# Patient Record
Sex: MALE | Race: OTHER | Employment: FULL TIME | URBAN - METROPOLITAN AREA
[De-identification: names, ages, dates, MRNs, and addresses within clinical notes are randomized per-mention and may not be internally consistent; named-entity substitution may affect disease eponyms.]

---

## 2017-04-25 ENCOUNTER — OFFICE VISIT (OUTPATIENT)
Dept: FAMILY MEDICINE CLINIC | Age: 32
End: 2017-04-25

## 2017-04-25 VITALS
TEMPERATURE: 97.5 F | DIASTOLIC BLOOD PRESSURE: 97 MMHG | HEART RATE: 77 BPM | RESPIRATION RATE: 16 BRPM | SYSTOLIC BLOOD PRESSURE: 140 MMHG | OXYGEN SATURATION: 98 % | WEIGHT: 277.4 LBS | BODY MASS INDEX: 44.58 KG/M2 | HEIGHT: 66 IN

## 2017-04-25 DIAGNOSIS — H53.8 BLURRY VISION: ICD-10-CM

## 2017-04-25 DIAGNOSIS — I10 ESSENTIAL HYPERTENSION: Primary | ICD-10-CM

## 2017-04-25 DIAGNOSIS — E66.09 NON MORBID OBESITY DUE TO EXCESS CALORIES: ICD-10-CM

## 2017-04-25 LAB — HBA1C MFR BLD HPLC: 5.3 %

## 2017-04-25 RX ORDER — LISINOPRIL 10 MG/1
10 TABLET ORAL DAILY
Qty: 30 TAB | Refills: 3 | Status: SHIPPED | OUTPATIENT
Start: 2017-04-25 | End: 2018-04-09 | Stop reason: SDUPTHER

## 2017-04-25 NOTE — PROGRESS NOTES
Christina Mike is a 29 y.o.  male and presents with     Chief Complaint   Patient presents with    Hypertension    Obesity       Pt was jorge alberto mcgregor in 2014. Pt is currently not taking any meds. Has dizziness. Pts father  has  DM. Pt has gained wt. No past medical history on file. No past surgical history on file. Current Outpatient Prescriptions   Medication Sig    lisinopril (PRINIVIL, ZESTRIL) 10 mg tablet Take 1 Tab by mouth daily. No current facility-administered medications for this visit. Health Maintenance   Topic Date Due    DTaP/Tdap/Td series (1 - Tdap) 01/24/2008    INFLUENZA AGE 9 TO ADULT  08/01/2016     Immunization History   Administered Date(s) Administered    Influenza Vaccine 09/08/2014     No LMP for male patient. Allergies and Intolerances:   No Known Allergies    Family History:   Family History   Problem Relation Age of Onset    Diabetes Father        Social History:   He  reports that he has never smoked. He has never used smokeless tobacco.  He  reports that he does not drink alcohol.             Review of Systems:   General: negative for - chills, fatigue, fever, weight change  Psych: negative for - anxiety, depression, irritability or mood swings  ENT: negative for - headaches, hearing change, nasal congestion, oral lesions, sneezing or sore throat  Heme/ Lymph: negative for - bleeding problems, bruising, pallor or swollen lymph nodes  Endo: negative for - hot flashes, polydipsia/polyuria or temperature intolerance  Resp: negative for - cough, shortness of breath or wheezing  CV: negative for - chest pain, edema or palpitations  GI: negative for - abdominal pain, change in bowel habits, constipation, diarrhea or nausea/vomiting  : negative for - dysuria, hematuria, incontinence, pelvic pain or vulvar/vaginal symptoms  MSK: negative for - joint pain, joint swelling or muscle pain  Neuro: negative for - confusion, headaches, seizures or weakness, pos for dizziness  Derm: negative for - dry skin, hair changes, rash or skin lesion changes          Physical:   Vitals:   Vitals:    04/25/17 0755   BP: (!) 140/97   Pulse: 77   Resp: 16   Temp: 97.5 °F (36.4 °C)   TempSrc: Oral   SpO2: 98%   Weight: 277 lb 6.4 oz (125.8 kg)   Height: 5' 6\" (1.676 m)           Exam:   HEENT- atraumatic,normocephalic, awake, oriented, well nourished  Neck - supple,no enlarged lymph nodes, no JVD, no thyromegaly  Chest- CTA, no rhonchi, no crackles  Heart- rrr, no murmurs / gallop/rub  Abdomen- soft,BS+,NT, no hepatosplenomegaly  Ext - no c/c/edema   Neuro- no focal deficits. Power 5/5 all extremities  Skin - warm,dry, no obvious rashes. Review of Data:   LABS:   Lab Results   Component Value Date/Time    WBC 6.3 06/25/2014 12:00 AM    HGB 16.3 06/25/2014 12:00 AM    HCT 48.6 06/25/2014 12:00 AM    PLATELET 055 75/94/8904 12:00 AM     Lab Results   Component Value Date/Time    Sodium 141 06/25/2014 12:00 AM    Potassium 4.5 06/25/2014 12:00 AM    Chloride 102 06/25/2014 12:00 AM    CO2 25 06/25/2014 12:00 AM    Glucose 93 06/25/2014 12:00 AM    BUN 15 06/25/2014 12:00 AM    Creatinine 1.09 06/25/2014 12:00 AM     No results found for: CHOL, CHOLX, CHLST, CHOLV, HDL, LDL, DLDL, LDLC, DLDLP, TGL, TGLX, TRIGL, TRIGP  No results found for: GPT        Impression / Plan:        ICD-10-CM ICD-9-CM    1. Essential hypertension I10 401.9 lisinopril (PRINIVIL, ZESTRIL) 10 mg tablet   2. Non morbid obesity due to excess calories E66.09 278.00      Low salt diet, wt loss , diet ,exercise recommended    Side effects -including cough , difficulty breathing explained. .      Explained to patient risk benefits of the medications. Advised patient to stop meds if having any side effects. Pt verbalized understanding of the instructions. I have discussed the diagnosis with the patient and the intended plan as seen in the above orders.   The patient has received an after-visit summary and questions were answered concerning future plans. I have discussed medication side effects and warnings with the patient as well. I have reviewed the plan of care with the patient, accepted their input and they are in agreement with the treatment goals. Reviewed plan of care. Patient has provided input and agrees with goals.     Follow-up Disposition: Not on Dustin Marcano MD

## 2017-08-08 ENCOUNTER — DOCUMENTATION ONLY (OUTPATIENT)
Dept: FAMILY MEDICINE CLINIC | Age: 32
End: 2017-08-08

## 2017-08-08 NOTE — LETTER
8/8/2017 Ed [address] [phone] Dear Mr. Gadiel Ruiz, We had an appointment reserved for you 7/25/2017 and were concerned when you did not show or call within 24 hours to cancel the appointment. Our policy is to call patients two days prior to their appointment to remind them of the date and time. We perform these calls as a courtesy to our patients and to allow us the opportunity to rebook the time slot should the appointment not be necessary. Recognizing that everyones time is valuable and that appointment time is limited, we ask that you provide 24 hours notice if you are unable to keep your appointment. Please call us at your earliest convenience to reschedule your appointment as your provider felt it was important to see you. Thank you for your anticipated cooperation. The scheduling staff: 
 
[address] [place] [address],[address] [place] [address] [phone]

## 2018-01-15 ENCOUNTER — OFFICE VISIT (OUTPATIENT)
Dept: FAMILY MEDICINE CLINIC | Age: 33
End: 2018-01-15

## 2018-01-15 VITALS
HEART RATE: 79 BPM | WEIGHT: 267 LBS | HEIGHT: 66 IN | BODY MASS INDEX: 42.91 KG/M2 | OXYGEN SATURATION: 98 % | RESPIRATION RATE: 18 BRPM | DIASTOLIC BLOOD PRESSURE: 88 MMHG | TEMPERATURE: 97.8 F | SYSTOLIC BLOOD PRESSURE: 142 MMHG

## 2018-01-15 DIAGNOSIS — R53.82 CHRONIC FATIGUE: ICD-10-CM

## 2018-01-15 DIAGNOSIS — I10 ESSENTIAL HYPERTENSION: Primary | ICD-10-CM

## 2018-01-15 DIAGNOSIS — R06.83 SNORING: ICD-10-CM

## 2018-01-15 DIAGNOSIS — M54.9 OTHER CHRONIC BACK PAIN: ICD-10-CM

## 2018-01-15 DIAGNOSIS — E66.09 CLASS 1 OBESITY DUE TO EXCESS CALORIES WITH SERIOUS COMORBIDITY IN ADULT, UNSPECIFIED BMI: ICD-10-CM

## 2018-01-15 DIAGNOSIS — G89.29 OTHER CHRONIC BACK PAIN: ICD-10-CM

## 2018-01-15 PROBLEM — E66.01 OBESITY, MORBID (HCC): Status: ACTIVE | Noted: 2018-01-15

## 2018-01-15 RX ORDER — TRAMADOL HYDROCHLORIDE 50 MG/1
50 TABLET ORAL
Qty: 30 TAB | Refills: 0 | Status: SHIPPED | OUTPATIENT
Start: 2018-01-15 | End: 2018-04-09 | Stop reason: SDUPTHER

## 2018-01-15 RX ORDER — HYDROCHLOROTHIAZIDE 25 MG/1
25 TABLET ORAL DAILY
Qty: 30 TAB | Refills: 3 | Status: SHIPPED | OUTPATIENT
Start: 2018-01-15 | End: 2019-03-27 | Stop reason: ALTCHOICE

## 2018-01-15 NOTE — MR AVS SNAPSHOT
[address] [place] [address] 
[phone] Patient: Germán Natarajan MRN: [id number] :1985 Visit Information Date & Time Provider Department Dept. Phone Encounter #  
 1/15/2018  4:00 PM [address], [address] [place] [address] [phone] [id number] Follow-up Instructions Return in about 2 months (around 3/15/2018). Upcoming Health Maintenance Date Due DTaP/Tdap/Td series (1 - Tdap) 2008 Allergies as of 1/15/2018  Review Complete On: 1/15/2018 By: Sandee Tristan LPN No Known Allergies Current Immunizations  Never Reviewed Name Date Influenza Vaccine 2014  8:25 AM  
  
 Not reviewed this visit You Were Diagnosed With   
  
 Codes Comments Essential hypertension    -  Primary ICD-10-CM: I10 
ICD-9-CM: 401.9 Snoring     ICD-10-CM: R06.83 
ICD-9-CM: 786.09 Chronic fatigue     ICD-10-CM: R53.82 
ICD-9-CM: 780.79 Class 1 obesity due to excess calories with serious comorbidity in adult, unspecified BMI     ICD-10-CM: E66.09 
ICD-9-CM: 278.00 Other chronic back pain     ICD-10-CM: M54.9, G89.29 ICD-9-CM: 724.5, 338.29 Vitals BP Pulse Temp Resp Height(growth percentile) Weight(growth percentile) 142/88 79 97.8 °F (36.6 °C) (Oral) 18 5' 6\" (1.676 m) 267 lb (121.1 kg) SpO2 BMI Smoking Status 98% 43.09 kg/m2 Never Smoker Vitals History BMI and BSA Data Body Mass Index Body Surface Area 43.09 kg/m 2 2.37 m 2 Preferred Pharmacy Pharmacy Name Phone [address], [facility] Tammie Donnelly [phone] Your Updated Medication List  
  
   
This list is accurate as of: 1/15/18  4:52 PM.  Always use your most recent med list.  
  
  
  
  
 hydroCHLOROthiazide 25 mg tablet Commonly known as:  HYDRODIURIL Take 1 Tab by mouth daily. lisinopril 10 mg tablet Commonly known as:  Alcario Leonardo Take 1 Tab by mouth daily. traMADol 50 mg tablet Commonly known as:  ULTRAM  
Take 1 Tab by mouth every six (6) hours as needed for Pain. Prescriptions Printed Refills  
 traMADol (ULTRAM) 50 mg tablet 0 Sig: Take 1 Tab by mouth every six (6) hours as needed for Pain. Class: Print Route: Oral  
  
Prescriptions Sent to Pharmacy Refills  
 hydroCHLOROthiazide (HYDRODIURIL) 25 mg tablet 3 Sig: Take 1 Tab by mouth daily. Class: Normal  
 Pharmacy: [facility] [address] SIMMONS [address], [address] Ph #: [phone] Route: Oral  
  
We Performed the Following REFERRAL TO NEUROLOGY [XBB11 Custom] Follow-up Instructions Return in about 2 months (around 3/15/2018). To-Do List   
 01/15/2018 Lab:  LIPID PANEL   
  
 01/15/2018 Lab:  TSH 3RD GENERATION Referral Information Referral ID Referred By Referred To  
  
 [id number] [facility], Alyssia Merino MD   
   [address] [place] [address], [address] Phone: [phone] Fax: [phone] Visits Status Start Date End Date 1 New Request 1/15/18 1/15/19 If your referral has a status of pending review or denied, additional information will be sent to support the outcome of this decision. Introducing [facility]! [facility] introduces Cocodot patient portal. Now you can access parts of your medical record, email your doctor's office, and request medication refills online. 1. In your internet browser, go to [email or web address] 2. Click on the First Time User? Click Here link in the Sign In box. You will see the New Member Sign Up page. 3. Enter your Cocodot Access Code exactly as it appears below. You will not need to use this code after youve completed the sign-up process.  If you do not sign up before the expiration date, you must request a new code. · Wattage Access Code: [email or web address] Expires: 4/15/2018  4:52 PM 
 
4. Enter the last four digits of your Social Security Number (xxxx) and Date of Birth (mm/dd/yyyy) as indicated and click Submit. You will be taken to the next sign-up page. 5. Create a Wattage ID. This will be your Wattage login ID and cannot be changed, so think of one that is secure and easy to remember. 6. Create a Wattage password. You can change your password at any time. 7. Enter your Password Reset Question and Answer. This can be used at a later time if you forget your password. 8. Enter your e-mail address. You will receive e-mail notification when new information is available in [address] 9. Click Sign Up. You can now view and download portions of your medical record. 10. Click the Download Summary menu link to download a portable copy of your medical information. If you have questions, please visit the Frequently Asked Questions section of the Wattage website. Remember, Wattage is NOT to be used for urgent needs. For medical emergencies, dial 911. Now available from your iPhone and Android! Please provide this summary of care documentation to your next provider. Your primary care clinician is listed as Leighton Ziegler. If you have any questions after today's visit, please call [phone].

## 2018-01-15 NOTE — PROGRESS NOTES
Cheryle Blinks is a 29 y.o.  male and presents with     Chief Complaint   Patient presents with    Hypertension    Obesity    Fatigue    Back Pain       Pt feels tired. He snores. Has dizziness  Pt is obese. Pt denies feeling depressed. Pt also has back pain. 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 No past surgical history on file. Current Outpatient Prescriptions   Medication Sig    hydroCHLOROthiazide (HYDRODIURIL) 25 mg tablet Take 1 Tab by mouth daily.  traMADol (ULTRAM) 50 mg tablet Take 1 Tab by mouth every six (6) hours as needed for Pain.  lisinopril (PRINIVIL, ZESTRIL) 10 mg tablet Take 1 Tab by mouth daily. No current facility-administered medications for this visit. Health Maintenance   Topic Date Due    DTaP/Tdap/Td series (1 - Tdap) 01/24/2008    Influenza Age 5 to Adult  Addressed     Immunization History   Administered Date(s) Administered    Influenza Vaccine 09/08/2014     No LMP for male patient. Allergies and Intolerances:   No Known Allergies    Family History:   Family History   Problem Relation Age of Onset    Diabetes Father        Social History:   He  reports that he has never smoked. He has never used smokeless tobacco.  He  reports that he does not drink alcohol.             Review of Systems: pos for back pain  General: negative for - chills, pos for fatigue, obese  Psych: negative for - anxiety, depression, irritability or mood swings  ENT: negative for - headaches, hearing change, nasal congestion, oral lesions, sneezing or sore throat  Heme/ Lymph: negative for - bleeding problems, bruising, pallor or swollen lymph nodes  Endo: negative for - hot flashes, polydipsia/polyuria or temperature intolerance  Resp: negative for - cough, shortness of breath or wheezing  CV: negative for - chest pain, edema or palpitations  GI: negative for - abdominal pain, change in bowel habits, constipation, diarrhea or nausea/vomiting  : negative for - dysuria, hematuria, incontinence, pelvic pain or vulvar/vaginal symptoms  MSK: negative for - joint pain, joint swelling or muscle pain  Neuro: negative for - confusion, headaches, seizures or weakness  Derm: negative for - dry skin, hair changes, rash or skin lesion changes          Physical:   Vitals:   Vitals:    01/15/18 1619   BP: 142/88   Pulse: 79   Resp: 18   Temp: 97.8 °F (36.6 °C)   TempSrc: Oral   SpO2: 98%   Weight: 267 lb (121.1 kg)   Height: 5' 6\" (1.676 m)           Exam:   HEENT- atraumatic,normocephalic, awake, oriented, well nourished,obese  Neck - supple,no enlarged lymph nodes, no JVD, no thyromegaly  Chest- CTA, no rhonchi, no crackles  Heart- rrr, no murmurs / gallop/rub  Abdomen- soft,BS+,NT, no hepatosplenomegaly  Ext - no c/c/edema   Neuro- no focal deficits. Power 5/5 all extremities  Skin - warm,dry, no obvious rashes. Review of Data:   LABS:   Lab Results   Component Value Date/Time    WBC 6.3 06/25/2014 12:00 AM    HGB 16.3 06/25/2014 12:00 AM    HCT 48.6 06/25/2014 12:00 AM    PLATELET 504 37/61/1883 12:00 AM     Lab Results   Component Value Date/Time    Sodium 141 06/25/2014 12:00 AM    Potassium 4.5 06/25/2014 12:00 AM    Chloride 102 06/25/2014 12:00 AM    CO2 25 06/25/2014 12:00 AM    Glucose 93 06/25/2014 12:00 AM    BUN 15 06/25/2014 12:00 AM    Creatinine 1.09 06/25/2014 12:00 AM     No results found for: CHOL, CHOLX, CHLST, CHOLV, HDL, LDL, LDLC, DLDLP, TGLX, TRIGL, TRIGP  No results found for: GPT        Impression / Plan:        ICD-10-CM ICD-9-CM    1. Essential hypertension I10 401.9 TSH 3RD GENERATION      LIPID PANEL   2. Snoring R06.83 786.09 REFERRAL TO NEUROLOGY   3. Chronic fatigue R53.82 780.79 REFERRAL TO NEUROLOGY   4. Class 1 obesity due to excess calories with serious comorbidity in adult, unspecified BMI E66.09 278.00 LIPID PANEL      hydroCHLOROthiazide (HYDRODIURIL) 25 mg tablet   5.  Other chronic back pain M54.9 724.5 traMADol (ULTRAM) 50 mg tablet    G89.29 338.29      Wt loss , diet ,exercise      Explained to patient risk benefits of the medications. Advised patient to stop meds if having any side effects. Pt verbalized understanding of the instructions. I have discussed the diagnosis with the patient and the intended plan as seen in the above orders. The patient has received an after-visit summary and questions were answered concerning future plans. I have discussed medication side effects and warnings with the patient as well. I have reviewed the plan of care with the patient, accepted their input and they are in agreement with the treatment goals. Reviewed plan of care. Patient has provided input and agrees with goals. Follow-up Disposition:  Return in about 2 months (around 3/15/2018).     [address] MD

## 2018-01-15 NOTE — PROGRESS NOTES
1. Have you been to the ER, urgent care clinic since your last visit? Hospitalized since your last visit? No    2. Have you seen or consulted any other health care providers outside of the [address] [place] [address] since your last visit? Include any pap smears or colon screening.  No

## 2018-04-09 ENCOUNTER — OFFICE VISIT (OUTPATIENT)
Dept: FAMILY MEDICINE CLINIC | Age: 33
End: 2018-04-09

## 2018-04-09 ENCOUNTER — HOSPITAL ENCOUNTER (OUTPATIENT)
Dept: LAB | Age: 33
Discharge: HOME | End: 2018-04-09
Payer: SELF-PAY

## 2018-04-09 ENCOUNTER — TELEPHONE (OUTPATIENT)
Dept: FAMILY MEDICINE CLINIC | Age: 33
End: 2018-04-09

## 2018-04-09 VITALS
HEIGHT: 66 IN | WEIGHT: 264.3 LBS | BODY MASS INDEX: 42.47 KG/M2 | RESPIRATION RATE: 16 BRPM | HEART RATE: 70 BPM | TEMPERATURE: 97.6 F | DIASTOLIC BLOOD PRESSURE: 86 MMHG | SYSTOLIC BLOOD PRESSURE: 131 MMHG | OXYGEN SATURATION: 98 %

## 2018-04-09 DIAGNOSIS — M25.561 RIGHT KNEE PAIN, UNSPECIFIED CHRONICITY: ICD-10-CM

## 2018-04-09 DIAGNOSIS — M10.00 IDIOPATHIC GOUT, UNSPECIFIED CHRONICITY, UNSPECIFIED SITE: Primary | ICD-10-CM

## 2018-04-09 DIAGNOSIS — E66.9 OBESITY (BMI 30-39.9): ICD-10-CM

## 2018-04-09 DIAGNOSIS — M25.561 RIGHT KNEE PAIN, UNSPECIFIED CHRONICITY: Primary | ICD-10-CM

## 2018-04-09 DIAGNOSIS — I10 ESSENTIAL HYPERTENSION: ICD-10-CM

## 2018-04-09 DIAGNOSIS — M54.9 OTHER CHRONIC BACK PAIN: ICD-10-CM

## 2018-04-09 DIAGNOSIS — G89.29 OTHER CHRONIC BACK PAIN: ICD-10-CM

## 2018-04-09 LAB
EST. AVERAGE GLUCOSE BLD GHB EST-MCNC: 114 MG/DL
HBA1C MFR BLD: 5.6 % (ref 4.2–5.6)
URATE SERPL-MCNC: 7.7 MG/DL (ref 2.6–7.2)

## 2018-04-09 PROCEDURE — 83036 HEMOGLOBIN GLYCOSYLATED A1C: CPT | Performed by: INTERNAL MEDICINE

## 2018-04-09 PROCEDURE — 36415 COLL VENOUS BLD VENIPUNCTURE: CPT | Performed by: INTERNAL MEDICINE

## 2018-04-09 PROCEDURE — 84550 ASSAY OF BLOOD/URIC ACID: CPT | Performed by: INTERNAL MEDICINE

## 2018-04-09 RX ORDER — LISINOPRIL 10 MG/1
10 TABLET ORAL DAILY
Qty: 30 TAB | Refills: 3 | Status: SHIPPED | OUTPATIENT
Start: 2018-04-09 | End: 2019-03-27 | Stop reason: SDUPTHER

## 2018-04-09 RX ORDER — PREDNISONE 20 MG/1
20 TABLET ORAL
Qty: 10 TAB | Refills: 0 | Status: SHIPPED | OUTPATIENT
Start: 2018-04-09 | End: 2019-03-27

## 2018-04-09 RX ORDER — TRAMADOL HYDROCHLORIDE 50 MG/1
50 TABLET ORAL
Qty: 30 TAB | Refills: 0 | Status: SHIPPED | OUTPATIENT
Start: 2018-04-09 | End: 2019-03-27

## 2018-04-09 NOTE — MR AVS SNAPSHOT
[address] [place] [address] 
[phone] Patient: Regla Waters MRN: [id number] :1985 Visit Information Date & Time Provider Department Dept. Phone Encounter #  
 2018  8:00 AM [id number], [address] [address] [phone] Follow-up Instructions Return in about 3 months (around 2018). Upcoming Health Maintenance Date Due DTaP/Tdap/Td series (1 - Tdap) 2008 Allergies as of 2018  Review Complete On: 1/15/2018 By: Melchor Nava LPN No Known Allergies Current Immunizations  Never Reviewed Name Date Influenza Vaccine 2014  8:25 AM  
  
 Not reviewed this visit You Were Diagnosed With   
  
 Codes Comments Right knee pain, unspecified chronicity    -  Primary ICD-10-CM: M25.561 ICD-9-CM: 719.46 Essential hypertension     ICD-10-CM: I10 
ICD-9-CM: 401.9 Other chronic back pain     ICD-10-CM: M54.9, G89.29 ICD-9-CM: 724.5, 338.29 Obesity (BMI 30-39. 9)     ICD-10-CM: E66.9 ICD-9-CM: 278.00 Vitals BP Pulse Temp Resp Height(growth percentile) Weight(growth percentile) 131/86 70 97.6 °F (36.4 °C) (Oral) 16 5' 6\" (1.676 m) 264 lb 4.8 oz (119.9 kg) SpO2 BMI Smoking Status 98% 42.66 kg/m2 Never Smoker Vitals History BMI and BSA Data Body Mass Index Body Surface Area  
 42.66 kg/m 2 2.36 m 2 Preferred Pharmacy Pharmacy Name Phone [address], [address] [place] [address] [place] [phone] Your Updated Medication List  
  
   
This list is accurate as of 18  8:58 AM.  Always use your most recent med list.  
  
  
  
  
 hydroCHLOROthiazide 25 mg tablet Commonly known as:  HYDRODIURIL Take 1 Tab by mouth daily. lisinopril 10 mg tablet Commonly known as:  Kurt Aleksandra Take 1 Tab by mouth daily. traMADol 50 mg tablet Commonly known as:  ULTRAM  
Take 1 Tab by mouth every six (6) hours as needed for Pain. Prescriptions Printed Refills  
 traMADol (ULTRAM) 50 mg tablet 0 Sig: Take 1 Tab by mouth every six (6) hours as needed for Pain. Class: Print Route: Oral  
  
Prescriptions Sent to Pharmacy Refills  
 lisinopril (PRINIVIL, ZESTRIL) 10 mg tablet 3 Sig: Take 1 Tab by mouth daily. Class: Normal  
 Pharmacy: [facility] [address] SIMMONS [address], [address] Ph #: [phone] Route: Oral  
  
We Performed the Following REFERRAL TO PLASTIC SURGERY [REF89 Custom] Follow-up Instructions Return in about 3 months (around 7/9/2018). To-Do List   
 04/09/2018 Lab:  HEMOGLOBIN A1C WITH EAG   
  
 04/09/2018 Lab:  URIC ACID Referral Information Referral ID Referred By Referred To  
  
 [id number] Theodore LAURENT Not Available Visits Status Start Date End Date 1 New Request 4/9/18 4/9/19 If your referral has a status of pending review or denied, additional information will be sent to support the outcome of this decision. Introducing [facility]! [employer] introduces ERLink patient portal. Now you can access parts of your medical record, email your doctor's office, and request medication refills online. 1. In your internet browser, go to [email or web address] 2. Click on the First Time User? Click Here link in the Sign In box. You will see the New Member Sign Up page. 3. Enter your ERLink Access Code exactly as it appears below. You will not need to use this code after youve completed the sign-up process. If you do not sign up before the expiration date, you must request a new code. · ERLink Access Code: [email or web address] Expires: 4/15/2018  5:52 PM 
 
4. Enter the last four digits of your Social Security Number (xxxx) and Date of Birth (mm/dd/yyyy) as indicated and click Submit.  You will be taken to the next sign-up page. 5. Create a iMedicare ID. This will be your iMedicare login ID and cannot be changed, so think of one that is secure and easy to remember. 6. Create a iMedicare password. You can change your password at any time. 7. Enter your Password Reset Question and Answer. This can be used at a later time if you forget your password. 8. Enter your e-mail address. You will receive e-mail notification when new information is available in [address] 9. Click Sign Up. You can now view and download portions of your medical record. 10. Click the Download Summary menu link to download a portable copy of your medical information. If you have questions, please visit the Frequently Asked Questions section of the iMedicare website. Remember, iMedicare is NOT to be used for urgent needs. For medical emergencies, dial 911. Now available from your iPhone and Android! Please provide this summary of care documentation to your next provider. Your primary care clinician is listed as Vane Gilbert. If you have any questions after today's visit, please call [phone].

## 2018-04-09 NOTE — PROGRESS NOTES
Atilio Saldana is a 34 y.o.  male and presents with     Chief Complaint   Patient presents with    Leg Pain    Back Pain    Hypertension    Knee Pain       Pt has rt knee pain and ankle pain. Pt wants to be checked for gout. Tramadol seemed to help. Pt is not taking HTN meds. Pt wants plastic surgery. Pt says he will pay cash        No past medical history on file. No past surgical history on file. Current Outpatient Prescriptions   Medication Sig    lisinopril (PRINIVIL, ZESTRIL) 10 mg tablet Take 1 Tab by mouth daily.  traMADol (ULTRAM) 50 mg tablet Take 1 Tab by mouth every six (6) hours as needed for Pain.  hydroCHLOROthiazide (HYDRODIURIL) 25 mg tablet Take 1 Tab by mouth daily. No current facility-administered medications for this visit. Health Maintenance   Topic Date Due    DTaP/Tdap/Td series (1 - Tdap) 01/24/2008    Influenza Age 5 to Adult  Addressed     Immunization History   Administered Date(s) Administered    Influenza Vaccine 09/08/2014     No LMP for male patient. Allergies and Intolerances:   No Known Allergies    Family History:   Family History   Problem Relation Age of Onset    Diabetes Father        Social History:   He  reports that he has never smoked. He has never used smokeless tobacco.  He  reports that he does not drink alcohol.             Review of Systems:   General: negative for - chills, fatigue, fever, weight change  Psych: negative for - anxiety, depression, irritability or mood swings  ENT: negative for - headaches, hearing change, nasal congestion, oral lesions, sneezing or sore throat  Heme/ Lymph: negative for - bleeding problems, bruising, pallor or swollen lymph nodes  Endo: negative for - hot flashes, polydipsia/polyuria or temperature intolerance  Resp: negative for - cough, shortness of breath or wheezing  CV: negative for - chest pain, edema or palpitations  GI: negative for - abdominal pain, change in bowel habits, constipation, diarrhea or nausea/vomiting  : negative for - dysuria, hematuria, incontinence, pelvic pain or vulvar/vaginal symptoms  MSK: negative for - joint pain, joint swelling or muscle pain  Neuro: negative for - confusion, headaches, seizures or weakness  Derm: negative for - dry skin, hair changes, rash or skin lesion changes          Physical:   Vitals:   Vitals:    04/09/18 0823   BP: 131/86   Pulse: 70   Resp: 16   Temp: 97.6 °F (36.4 °C)   TempSrc: Oral   SpO2: 98%   Weight: 264 lb 4.8 oz (119.9 kg)   Height: 5' 6\" (1.676 m)           Exam:   HEENT- atraumatic,normocephalic, awake, oriented, well nourished,obese  Neck - supple,no enlarged lymph nodes, no JVD, no thyromegaly  Chest- CTA, no rhonchi, no crackles  Heart- rrr, no murmurs / gallop/rub  Abdomen- soft,BS+,NT, no hepatosplenomegaly  Ext - no c/c/edema   Neuro- no focal deficits. Power 5/5 all extremities  Skin - warm,dry, no obvious rashes. Review of Data:   LABS:   Lab Results   Component Value Date/Time    WBC 6.3 06/25/2014 12:00 AM    HGB 16.3 06/25/2014 12:00 AM    HCT 48.6 06/25/2014 12:00 AM    PLATELET 420 40/90/3019 12:00 AM     Lab Results   Component Value Date/Time    Sodium 141 06/25/2014 12:00 AM    Potassium 4.5 06/25/2014 12:00 AM    Chloride 102 06/25/2014 12:00 AM    CO2 25 06/25/2014 12:00 AM    Glucose 93 06/25/2014 12:00 AM    BUN 15 06/25/2014 12:00 AM    Creatinine 1.09 06/25/2014 12:00 AM     No results found for: CHOL, CHOLX, CHLST, CHOLV, HDL, LDL, LDLC, DLDLP, TGLX, TRIGL, TRIGP  No results found for: GPT        Impression / Plan:        ICD-10-CM ICD-9-CM    1. Right knee pain, unspecified chronicity M25.561 719.46 URIC ACID   2. Essential hypertension I10 401.9 lisinopril (PRINIVIL, ZESTRIL) 10 mg tablet   3. Other chronic back pain M54.9 724.5 traMADol (ULTRAM) 50 mg tablet    G89.29 338.29    4. Obesity (BMI 30-39. 9) E66.9 278.00 HEMOGLOBIN A1C WITH EAG      REFERRAL TO PLASTIC SURGERY         Explained to patient risk benefits of the medications. Advised patient to stop meds if having any side effects. Pt verbalized understanding of the instructions. I have discussed the diagnosis with the patient and the intended plan as seen in the above orders. The patient has received an after-visit summary and questions were answered concerning future plans. I have discussed medication side effects and warnings with the patient as well. I have reviewed the plan of care with the patient, accepted their input and they are in agreement with the treatment goals. Reviewed plan of care. Patient has provided input and agrees with goals.     Follow-up Disposition: Not on Katharine Young MD

## 2018-04-09 NOTE — PROGRESS NOTES
1. Have you been to the ER, urgent care clinic since your last visit? Hospitalized since your last visit? No    2. Have you seen or consulted any other health care providers outside of the [address] [place] [address] since your last visit? Include any pap smears or colon screening.  No

## 2018-04-10 NOTE — TELEPHONE ENCOUNTER
Uric acid. Is high. Pt probably has gout. Have sent prednisone to pharmacy. will ask pt to set appt in a month. Spoke with Malu Gibbs. She will  medication from pharmacy. This encounter will be closed.

## 2018-07-20 ENCOUNTER — DOCUMENTATION ONLY (OUTPATIENT)
Dept: FAMILY MEDICINE CLINIC | Age: 33
End: 2018-07-20

## 2018-07-20 NOTE — LETTER
7/20/2018 Stalin Samuel [address] [phone] [id number] Dear Mr. Stalin Samuel, We had an appointment reserved for you 7/9/18 and were concerned when you did not show or call within 24 hours to cancel the appointment. As mentioned in a previous letter to you, appointment time is limited and no-showed appointments may prevent another sick individual who needs to be seen from getting a preferred appointment time. Please note that a continued pattern of not showing for appointments may result in your inability to pre-book future appointments as well as possible discharge from the practice. Please call us at your earliest convenience to reschedule your appointment as your provider felt it was important to see you. Thank you for your anticipated cooperation. Sincerely, The scheduling staff [facility] [address] [place] [id number] [phone]

## 2019-03-27 ENCOUNTER — HOSPITAL ENCOUNTER (OUTPATIENT)
Dept: LAB | Age: 34
Discharge: HOME | End: 2019-03-27
Payer: SELF-PAY

## 2019-03-27 ENCOUNTER — OFFICE VISIT (OUTPATIENT)
Dept: FAMILY MEDICINE CLINIC | Age: 34
End: 2019-03-27

## 2019-03-27 VITALS
BODY MASS INDEX: 44.68 KG/M2 | RESPIRATION RATE: 20 BRPM | TEMPERATURE: 97.3 F | DIASTOLIC BLOOD PRESSURE: 84 MMHG | HEIGHT: 66 IN | OXYGEN SATURATION: 98 % | HEART RATE: 81 BPM | WEIGHT: 278 LBS | SYSTOLIC BLOOD PRESSURE: 136 MMHG

## 2019-03-27 DIAGNOSIS — K59.00 CONSTIPATION, UNSPECIFIED CONSTIPATION TYPE: ICD-10-CM

## 2019-03-27 DIAGNOSIS — I10 ESSENTIAL HYPERTENSION: Primary | ICD-10-CM

## 2019-03-27 DIAGNOSIS — G89.29 ACUTE EXACERBATION OF CHRONIC LOW BACK PAIN: ICD-10-CM

## 2019-03-27 DIAGNOSIS — M54.50 ACUTE EXACERBATION OF CHRONIC LOW BACK PAIN: ICD-10-CM

## 2019-03-27 DIAGNOSIS — I10 ESSENTIAL HYPERTENSION: ICD-10-CM

## 2019-03-27 DIAGNOSIS — E66.01 OBESITY, MORBID (HCC): ICD-10-CM

## 2019-03-27 LAB
ALBUMIN SERPL-MCNC: 3.8 G/DL (ref 3.4–5)
ALBUMIN/GLOB SERPL: 1.1 {RATIO} (ref 0.8–1.7)
ALP SERPL-CCNC: 81 U/L (ref 45–117)
ALT SERPL-CCNC: 108 U/L (ref 16–61)
ANION GAP SERPL CALC-SCNC: 6 MMOL/L (ref 3–18)
APPEARANCE UR: CLEAR
AST SERPL-CCNC: 46 U/L (ref 15–37)
BASOPHILS # BLD: 0 K/UL (ref 0–0.1)
BASOPHILS NFR BLD: 0 % (ref 0–2)
BILIRUB SERPL-MCNC: 0.4 MG/DL (ref 0.2–1)
BILIRUB UR QL: NEGATIVE
BUN SERPL-MCNC: 16 MG/DL (ref 7–18)
BUN/CREAT SERPL: 15 (ref 12–20)
CALCIUM SERPL-MCNC: 8.8 MG/DL (ref 8.5–10.1)
CHLORIDE SERPL-SCNC: 105 MMOL/L (ref 100–108)
CHOLEST SERPL-MCNC: 183 MG/DL
CO2 SERPL-SCNC: 29 MMOL/L (ref 21–32)
COLOR UR: YELLOW
CREAT SERPL-MCNC: 1.05 MG/DL (ref 0.6–1.3)
DIFFERENTIAL METHOD BLD: ABNORMAL
EOSINOPHIL # BLD: 0.1 K/UL (ref 0–0.4)
EOSINOPHIL NFR BLD: 3 % (ref 0–5)
ERYTHROCYTE [DISTWIDTH] IN BLOOD BY AUTOMATED COUNT: 13.1 % (ref 11.6–14.5)
EST. AVERAGE GLUCOSE BLD GHB EST-MCNC: 117 MG/DL
GLOBULIN SER CALC-MCNC: 3.4 G/DL (ref 2–4)
GLUCOSE SERPL-MCNC: 85 MG/DL (ref 74–99)
GLUCOSE UR STRIP.AUTO-MCNC: NEGATIVE MG/DL
HBA1C MFR BLD: 5.7 % (ref 4.2–5.6)
HCT VFR BLD AUTO: 48.2 % (ref 36–48)
HDLC SERPL-MCNC: 47 MG/DL (ref 40–60)
HDLC SERPL: 3.9 {RATIO} (ref 0–5)
HGB BLD-MCNC: 15.7 G/DL (ref 13–16)
HGB UR QL STRIP: NEGATIVE
KETONES UR QL STRIP.AUTO: NEGATIVE MG/DL
LDLC SERPL CALC-MCNC: 107.4 MG/DL (ref 0–100)
LEUKOCYTE ESTERASE UR QL STRIP.AUTO: NEGATIVE
LIPID PROFILE,FLP: ABNORMAL
LYMPHOCYTES # BLD: 1.8 K/UL (ref 0.9–3.6)
LYMPHOCYTES NFR BLD: 33 % (ref 21–52)
MCH RBC QN AUTO: 28.3 PG (ref 24–34)
MCHC RBC AUTO-ENTMCNC: 32.6 G/DL (ref 31–37)
MCV RBC AUTO: 87 FL (ref 74–97)
MONOCYTES # BLD: 0.4 K/UL (ref 0.05–1.2)
MONOCYTES NFR BLD: 8 % (ref 3–10)
NEUTS SEG # BLD: 2.9 K/UL (ref 1.8–8)
NEUTS SEG NFR BLD: 56 % (ref 40–73)
NITRITE UR QL STRIP.AUTO: NEGATIVE
PH UR STRIP: 5.5 [PH] (ref 5–8)
PLATELET # BLD AUTO: 261 K/UL (ref 135–420)
PMV BLD AUTO: 11 FL (ref 9.2–11.8)
POTASSIUM SERPL-SCNC: 4.5 MMOL/L (ref 3.5–5.5)
PROT SERPL-MCNC: 7.2 G/DL (ref 6.4–8.2)
PROT UR STRIP-MCNC: NEGATIVE MG/DL
RBC # BLD AUTO: 5.54 M/UL (ref 4.7–5.5)
SODIUM SERPL-SCNC: 140 MMOL/L (ref 136–145)
SP GR UR REFRACTOMETRY: 1.02 (ref 1–1.03)
T4 FREE SERPL-MCNC: 1 NG/DL (ref 0.7–1.5)
TRIGL SERPL-MCNC: 143 MG/DL (ref ?–150)
TSH SERPL DL<=0.05 MIU/L-ACNC: 3.9 UIU/ML (ref 0.36–3.74)
UROBILINOGEN UR QL STRIP.AUTO: 0.2 EU/DL (ref 0.2–1)
VLDLC SERPL CALC-MCNC: 28.6 MG/DL
WBC # BLD AUTO: 5.3 K/UL (ref 4.6–13.2)

## 2019-03-27 PROCEDURE — 36415 COLL VENOUS BLD VENIPUNCTURE: CPT

## 2019-03-27 PROCEDURE — 80053 COMPREHEN METABOLIC PANEL: CPT

## 2019-03-27 PROCEDURE — 83036 HEMOGLOBIN GLYCOSYLATED A1C: CPT

## 2019-03-27 PROCEDURE — 81003 URINALYSIS AUTO W/O SCOPE: CPT

## 2019-03-27 PROCEDURE — 84439 ASSAY OF FREE THYROXINE: CPT

## 2019-03-27 PROCEDURE — 80061 LIPID PANEL: CPT

## 2019-03-27 PROCEDURE — 85025 COMPLETE CBC W/AUTO DIFF WBC: CPT

## 2019-03-27 RX ORDER — IBUPROFEN 600 MG/1
600 TABLET ORAL
Qty: 30 TAB | Refills: 0 | Status: SHIPPED | OUTPATIENT
Start: 2019-03-27

## 2019-03-27 RX ORDER — LISINOPRIL 10 MG/1
10 TABLET ORAL DAILY
Qty: 30 TAB | Refills: 6 | Status: SHIPPED | OUTPATIENT
Start: 2019-03-27

## 2019-03-27 RX ORDER — CYCLOBENZAPRINE HCL 10 MG
10 TABLET ORAL
Qty: 30 TAB | Refills: 0 | Status: SHIPPED | OUTPATIENT
Start: 2019-03-27

## 2019-03-27 RX ORDER — TRAMADOL HYDROCHLORIDE 50 MG/1
50 TABLET ORAL
Qty: 15 TAB | Refills: 0 | Status: SHIPPED | OUTPATIENT
Start: 2019-03-27 | End: 2019-04-01

## 2019-03-27 NOTE — PROGRESS NOTES
History of Present Illness  Arcelia Nation is a 30 y.o. male who presents today for management of    Chief Complaint   Patient presents with    Establish Care    Hypertension    Back Pain       Patient is here to establish care. Previous PCP: Dr. COOK [facility]    Back Pain  Patient presents for presents evaluation of low back problems. Symptoms have been present for 2 weeks and include pain in lower back (sharp in character; 9/10 in severity). 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 Symptoms are worst: afternoon. Alleviating factors identifiable by patient are recumbency. Exacerbating factors identifiable by patient are after work, period of inactivity. Treatments so far initiated by patient: OTC products. Previous lower back problems: yes. Previous workup: none. Previous treatments: none. Cardiovascular Review:  The patient has hypertension and obesity. Diet and Lifestyle: not attempting to follow a low fat, low cholesterol diet, not attempting to follow a low sodium diet, exercises regularly, nonsmoker  Home BP Monitoring: is not measured at home. Pertinent ROS: not taking medications regularly as instructed (ran out of medications one year ago), no medication side effects noted, no TIA's, no chest pain on exertion, no dyspnea on exertion, no swelling of ankles. Past Medical History  Past Medical History:   Diagnosis Date    Hypertension         Surgical History  History reviewed. No pertinent surgical history. Current Medications  Current Outpatient Medications   Medication Sig    cyclobenzaprine (FLEXERIL) 10 mg tablet Take 1 Tab by mouth three (3) times daily as needed for Muscle Spasm(s).  ibuprofen (MOTRIN) 600 mg tablet Take 1 Tab by mouth every six (6) hours as needed for Pain.  traMADol (ULTRAM) 50 mg tablet Take 1 Tab by mouth every eight (8) hours as needed for Pain for up to 5 days.  Max Daily Amount: 150 mg.    lisinopril (Lizeth Flight) 10 mg tablet Take 1 Tab by mouth daily. No current facility-administered medications for this visit.         Allergies/Drug Reactions  No Known Allergies     Family History  Family History   Problem Relation Age of Onset    Diabetes Father         Social History  Social History     Socioeconomic History    Marital status: SINGLE     Spouse name: Not on file    Number of children: Not on file    Years of education: Not on file    Highest education level: Not on file   Occupational History    Not on file   Social Needs    Financial resource strain: Not on file    Food insecurity:     Worry: Not on file     Inability: Not on file    Transportation needs:     Medical: Not on file     Non-medical: Not on file   Tobacco Use    Smoking status: Never Smoker    Smokeless tobacco: Never Used   Substance and Sexual Activity    Alcohol use: No    Drug use: No    Sexual activity: Never   Lifestyle    Physical activity:     Days per week: Not on file     Minutes per session: Not on file    Stress: Not on file   Relationships    Social connections:     Talks on phone: Not on file     Gets together: Not on file     Attends [employer] service: Not on file     Active member of club or organization: Not on file     Attends meetings of clubs or organizations: Not on file     Relationship status: Not on file    Intimate partner violence:     Fear of current or ex partner: Not on file     Emotionally abused: Not on file     Physically abused: Not on file     Forced sexual activity: Not on file   Other Topics Concern    Not on file   Social History Narrative    Not on file       Health Maintenance   Topic Date Due    DTaP/Tdap/Td series (1 - Tdap) 01/24/2008    Influenza Age 5 to Adult  08/01/2018    Pneumococcal 0-64 years  Aged [employer] History   Administered Date(s) Administered    Influenza Vaccine 09/08/2014       Review of Systems  Negative except as mentioned in HPI    Physical Exam  Vital signs:   Vitals:    03/27/19 0746   BP: 136/84   Pulse: 81   Resp: 20   Temp: 97.3 °F (36.3 °C)   TempSrc: Oral   SpO2: 98%   Weight: 278 lb (126.1 kg)   Height: 5' 6\" (1.676 m)       General: alert, oriented, not in distress  Head: scalp normal, atraumatic  Eyes: pupils are equal and reactive, full and intact EOM's  Neck: supple, no JVD, no lymphadenopathy, non-palpable thyroid  Chest/Lungs: clear breath sounds, no wheezing or crackles  Heart: normal rate, regular rhythm, no murmur  Abdomen: soft, non-distended, non-tender, normal bowel sounds, no organomegaly, no masses  Extremities: no focal deformities, no edema  Skin: no active skin lesions      Assessment/Plan:    1. Essential hypertension  - restart lisinopril  - d/c HCTZ  - CBC WITH AUTOMATED DIFF; Future  - HEMOGLOBIN A1C WITH EAG; Future  - LIPID PANEL; Future  - URINALYSIS W/ RFLX MICROSCOPIC; Future  - METABOLIC PANEL, COMPREHENSIVE; Future  - TSH AND FREE T4; Future  - lisinopril (PRINIVIL, ZESTRIL) 10 mg tablet; Take 1 Tab by mouth daily. Dispense: 30 Tab; Refill: 6    2. Acute exacerbation of chronic low back pain  - activity as tolerated  - cyclobenzaprine (FLEXERIL) 10 mg tablet; Take 1 Tab by mouth three (3) times daily as needed for Muscle Spasm(s). Dispense: 30 Tab; Refill: 0  - ibuprofen (MOTRIN) 600 mg tablet; Take 1 Tab by mouth every six (6) hours as needed for Pain. Dispense: 30 Tab; Refill: 0  - traMADol (ULTRAM) 50 mg tablet; Take 1 Tab by mouth every eight (8) hours as needed for Pain for up to 5 days. Max Daily Amount: 150 mg. Dispense: 15 Tab; Refill: 0    3. Constipation  - high fiber diet  - adequate hydration  - may use stool softeners    4. Obesity  - Discussed the patient's BMI with him.   The BMI follow up plan is as follows:   dietary management education, guidance, and counseling  encourage exercise  monitor weight  prescribed dietary intake      Follow-up and Dispositions    · Return in about 6 months (around 9/27/2019) for follow-up. I have discussed the diagnosis with the patient and the intended plan as seen in the above orders. The patient has received an after-visit summary and questions were answered concerning future plans. I have discussed medication side effects and warnings with the patient as well. I have reviewed the plan of care with the patient, accepted their input and they are in agreement with the treatment goals.        Ottoniel Collazo MD  March 27, 2019

## 2022-09-26 NOTE — MR AVS SNAPSHOT
Visit Information Date & Time Provider Department Dept. Phone Encounter #  
 4/25/2017  7:45 AM [address], [id number] [phone] [id number] Follow-up Instructions Return in about 3 months (around 7/25/2017). Upcoming Health Maintenance Date Due DTaP/Tdap/Td series (1 - Tdap) 1/24/2008 INFLUENZA AGE 9 TO ADULT 8/1/2016 Allergies as of 4/25/2017  Review Complete On: 4/25/2017 By: [id number] JO ANN Donnelly MD  
 No Known Allergies Current Immunizations  Never Reviewed Name Date Influenza Vaccine 9/8/2014  8:25 AM  
  
 Not reviewed this visit You Were Diagnosed With   
  
 Codes Comments Essential hypertension    -  Primary ICD-10-CM: I10 
ICD-9-CM: 401.9 Non morbid obesity due to excess calories     ICD-10-CM: E66.09 
ICD-9-CM: 278.00 Vitals BP Pulse Temp Resp Height(growth percentile) Weight(growth percentile) (!) 140/97 77 97.5 °F (36.4 °C) (Oral) 16 5' 6\" (1.676 m) 277 lb 6.4 oz (125.8 kg) SpO2 BMI Smoking Status 98% 44.77 kg/m2 Never Smoker Vitals History BMI and BSA Data Body Mass Index Body Surface Area 44.77 kg/m 2 2.42 m 2 Preferred Pharmacy Pharmacy Name Phone [facility] [address], [address] [phone] Your Updated Medication List  
  
   
This list is accurate as of: 4/25/17  8:23 AM.  Always use your most recent med list.  
  
  
  
  
 lisinopril 10 mg tablet Commonly known as:  Shirlie Holes Take 1 Tab by mouth daily. Prescriptions Sent to Pharmacy Refills  
 lisinopril (PRINIVIL, ZESTRIL) 10 mg tablet 3 Sig: Take 1 Tab by mouth daily. Class: Normal  
 Pharmacy: [id number], [address] Ph #: [phone] Route: Oral  
  
Follow-up Instructions Return in about 3 months (around 7/25/2017). Introducing [facility] SERVICES! [facility] introduces [employer]i patient portal. Now you can access parts of your medical record, email your doctor's office, and request medication refills online. 1. In your internet browser, go to [email or web address] 2. Click on the First Time User? Click Here link in the Sign In box. You will see the New Member Sign Up page. 3. Enter your Yooli Access Code exactly as it appears below. You will not need to use this code after youve completed the sign-up process. If you do not sign up before the expiration date, you must request a new code. · Yooli Access Code: [email or web address] Expires: 7/24/2017  8:23 AM 
 
4. Enter the last four digits of your Social Security Number (xxxx) and Date of Birth (mm/dd/yyyy) as indicated and click Submit. You will be taken to the next sign-up page. 5. Create a Yooli ID. This will be your Yooli login ID and cannot be changed, so think of one that is secure and easy to remember. 6. Create a Yooli password. You can change your password at any time. 7. Enter your Password Reset Question and Answer. This can be used at a later time if you forget your password. 8. Enter your e-mail address. You will receive e-mail notification when new information is available in [address] 9. Click Sign Up. You can now view and download portions of your medical record. 10. Click the Download Summary menu link to download a portable copy of your medical information. If you have questions, please visit the Frequently Asked Questions section of the Yooli website. Remember, Yooli is NOT to be used for urgent needs. For medical emergencies, dial 911. Now available from your iPhone and Android! Please provide this summary of care documentation to your next provider. Your primary care clinician is listed as Carolina Liu. If you have any questions after today's visit, please call [phone]. Never